# Patient Record
Sex: MALE | URBAN - METROPOLITAN AREA
[De-identification: names, ages, dates, MRNs, and addresses within clinical notes are randomized per-mention and may not be internally consistent; named-entity substitution may affect disease eponyms.]

---

## 2021-03-04 ENCOUNTER — CLAIMS CREATED FROM THE CLAIM WINDOW (OUTPATIENT)
Dept: URBAN - METROPOLITAN AREA CLINIC 25 | Facility: CLINIC | Age: 66
End: 2021-03-04
Payer: COMMERCIAL

## 2021-03-04 VITALS
DIASTOLIC BLOOD PRESSURE: 75 MMHG | WEIGHT: 138 LBS | HEIGHT: 64 IN | BODY MASS INDEX: 23.56 KG/M2 | SYSTOLIC BLOOD PRESSURE: 122 MMHG | TEMPERATURE: 98 F | HEART RATE: 103 BPM

## 2021-03-04 DIAGNOSIS — R19.4 CHANGE IN BOWEL HABITS: ICD-10-CM

## 2021-03-04 DIAGNOSIS — K59.04 CHRONIC IDIOPATHIC CONSTIPATION: ICD-10-CM

## 2021-03-04 DIAGNOSIS — R19.7 OVERFLOW DIARRHEA: ICD-10-CM

## 2021-03-04 PROCEDURE — 99243 OFF/OP CNSLTJ NEW/EST LOW 30: CPT | Performed by: INTERNAL MEDICINE

## 2021-03-04 RX ORDER — POLYETHYLENE GLYCOL 3350, SODIUM SULFATE ANHYDROUS, SODIUM BICARBONATE, SODIUM CHLORIDE, POTASSIUM CHLORIDE 236; 22.74; 6.74; 5.86; 2.97 G/4L; G/4L; G/4L; G/4L; G/4L
AS DIRECTED POWDER, FOR SOLUTION ORAL ONCE
Qty: 1 GALLON | Refills: 0 | OUTPATIENT
Start: 2021-03-04 | End: 2021-03-05

## 2021-03-04 NOTE — HPI-TODAY'S VISIT:
66 yo patient presents as a consult from Dr. Vera for alternating constipation and diarrhea since November 2020. Patient had a normal colonoscopy 10 years ago. Pt had abdominal surgery 06/1995 for appendicitis.

## 2021-03-05 ENCOUNTER — TELEPHONE ENCOUNTER (OUTPATIENT)
Dept: URBAN - METROPOLITAN AREA CLINIC 96 | Facility: CLINIC | Age: 66
End: 2021-03-05

## 2021-03-19 ENCOUNTER — DASHBOARD ENCOUNTERS (OUTPATIENT)
Age: 66
End: 2021-03-19

## 2021-03-19 ENCOUNTER — OFFICE VISIT (OUTPATIENT)
Dept: URBAN - METROPOLITAN AREA CLINIC 25 | Facility: CLINIC | Age: 66
End: 2021-03-19
Payer: COMMERCIAL

## 2021-03-19 VITALS
SYSTOLIC BLOOD PRESSURE: 114 MMHG | WEIGHT: 136 LBS | HEIGHT: 64 IN | BODY MASS INDEX: 23.22 KG/M2 | TEMPERATURE: 97.1 F | HEART RATE: 105 BPM | DIASTOLIC BLOOD PRESSURE: 74 MMHG

## 2021-03-19 DIAGNOSIS — R19.7 OVERFLOW DIARRHEA: ICD-10-CM

## 2021-03-19 DIAGNOSIS — R19.4 CHANGE IN BOWEL HABITS: ICD-10-CM

## 2021-03-19 DIAGNOSIS — K59.04 CHRONIC IDIOPATHIC CONSTIPATION: ICD-10-CM

## 2021-03-19 PROBLEM — 82934008: Status: ACTIVE | Noted: 2021-03-04

## 2021-03-19 PROCEDURE — 99214 OFFICE O/P EST MOD 30 MIN: CPT | Performed by: INTERNAL MEDICINE

## 2021-03-19 RX ORDER — POLYETHYLENE GLYCOL 3350, SODIUM SULFATE, SODIUM CHLORIDE, POTASSIUM CHLORIDE, ASCORBIC ACID, SODIUM ASCORBATE 140-9-5.2G
AS DIRECTED KIT ORAL ONCE
Qty: 1 KIT | Refills: 0 | OUTPATIENT
Start: 2021-03-19 | End: 2021-03-20

## 2021-03-19 NOTE — HPI-TODAY'S VISIT:
March 2021: accompanied by wife. Patient states he has been experiencing alternating constipation and diarrhea since November 2020. Patient had a normal colonoscopy 10 years ago. Pt had abdominal surgery 06/1995 for appendicitis. no abdominal pain. no rectal bleeding. family history unknown - pt was adopted.

## 2021-04-01 ENCOUNTER — LAB OUTSIDE AN ENCOUNTER (OUTPATIENT)
Dept: URBAN - METROPOLITAN AREA CLINIC 84 | Facility: CLINIC | Age: 66
End: 2021-04-01

## 2021-04-16 ENCOUNTER — OFFICE VISIT (OUTPATIENT)
Dept: URBAN - METROPOLITAN AREA SURGERY CENTER 20 | Facility: SURGERY CENTER | Age: 66
End: 2021-04-16